# Patient Record
Sex: FEMALE | Race: WHITE | NOT HISPANIC OR LATINO | Employment: PART TIME | URBAN - NONMETROPOLITAN AREA
[De-identification: names, ages, dates, MRNs, and addresses within clinical notes are randomized per-mention and may not be internally consistent; named-entity substitution may affect disease eponyms.]

---

## 2023-01-19 ENCOUNTER — HOSPITAL ENCOUNTER (OUTPATIENT)
Dept: RADIOLOGY | Facility: CLINIC | Age: 58
Discharge: HOME | End: 2023-01-19

## 2023-01-19 DIAGNOSIS — R05.3 PERSISTENT COUGH: ICD-10-CM

## 2023-12-04 ENCOUNTER — EVALUATION (OUTPATIENT)
Dept: PHYSICAL THERAPY | Facility: CLINIC | Age: 58
End: 2023-12-04
Payer: COMMERCIAL

## 2023-12-04 DIAGNOSIS — R42 VERTIGO: Primary | ICD-10-CM

## 2023-12-04 PROCEDURE — 97110 THERAPEUTIC EXERCISES: CPT | Performed by: PHYSICAL THERAPIST

## 2023-12-04 PROCEDURE — 97162 PT EVAL MOD COMPLEX 30 MIN: CPT | Performed by: PHYSICAL THERAPIST

## 2023-12-04 NOTE — PROGRESS NOTES
PT Evaluation     Today's date: 2023  Patient name: Jennifer Edgar  : 1965  MRN: [id number]  Referring provider: Jamison Walker MD  Dx:   Encounter Diagnosis     ICD-10-CM    1. Vertigo  R42                      Assessment  Assessment details: Jennifer Edgar is a pleasant 63 y.o. female presenting to PT with cc of vertiginous dysequilibrium for past 2-3 months. Pt. States issues began insidiously but have been progressively worsening. Upon examination, patient was found to have objective deficits as listed below and is displaying ss consistent with vestibular hypofunction without any bppv or oculomotor deficits. 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 Pt. Was educated on role of PT to address issues. Pt. Would benefit from skilled physical therapy to promote improved function and maximize activity tolerance. Goals  ST weeks  -Pt. Will demonstate narrow dheeraj c EC for 30"  -Pt. Will demonstrate FGA score reduction 25%    Lt weeks  -Pt. Will demonstrate FGA WNL  -pt.  Will demonstrate HARPREET WNL      Plan  Patient would benefit from: skilled physical therapy  Planned therapy interventions: abdominal trunk stabilization, balance, balance/weight bearing training, body mechanics training, functional ROM exercises, gait training, graded motor, transfer training, therapeutic training, therapeutic exercise, therapeutic activities, stretching, strengthening, patient education, neuromuscular re-education, motor coordination training, manual therapy, kinesiology taping, joint mobilization and canalith repositioning  Frequency: 2x week  Duration in weeks: 6  Plan of Care beginning date: 2023  Plan of Care expiration date: 1/15/2024  Treatment plan discussed with: patient        Subjective Evaluation    History of Present Illness  Mechanism of injury: Jennifer Edgar presents to PT with cc of insidious onset dysequilibrium symptoms for past 2-3 months. She denies any PITER that she can recall. She denies any dysphagia, dysarthria, drop attacks. She denies any medication changes. She feels as if the world is moving around her when she is standing still, particularly in busy or stressful environments. She has symptoms of room spinning but this is present when she is standing still. Denies any major symptoms when rolling over in bed. She denies any tinnitus or aural fullness. No pain or paresthesias. Has received blood work. Is taking meclizine from PCP. Patient Goals  Patient goals for therapy: improved balance    Social Support  Steps to enter house: yes  Stairs in house: yes     Employment status: working  Treatments  Previous treatment: medication  Current treatment: medication        Objective     Concurrent Complaints  Positive for memory loss and poor concentration. Negative for nausea/motion sickness, tinnitus, visual change, hearing loss, aural fullness and peripheral neuropathy  Neuro Exam:     Dizziness  Positive for disequilibrium, vertigo, oscillopsia, rocking or swaying and floating or swimming. Negative for motion sickness, light-headedness and diplopia. Exacerbating factors  Positive for bending over, looking up, walking, turning head, supine to/from sitting, optokinetic movement and walking in busy environment.      Oculomotor exam   Oculomotor ROM: WNL  Resting nystagmus: not present   Gaze holding nystagmus: not present left  and not present right  Smooth pursuits: within normal limits  Vertical saccades: normal  Horizontal saccades: normal  Convergence: normal  Cover test: normal  Crossover test: normal  Head thrust: left abnormal and right abnormal  Dynamic visual acuity: normal    Positional testing   [place]-Hallpike   Left posterior canal: WNL  Right posterior canal: WNL  Roll test   Left horizontal canal: WNL  Right horizontal canal: WNL  Positional testing comment: AUGUST MULLINS     See Media      Flowsheet Rows      Flowsheet Row Most Recent Value   PT/OT G-Codes    Current Score 46   Projected Score 61   FOTO information reviewed Yes               Precautions: None     Daily Treatment Diary:      Initial Evaluation Date: 12/04/23  Compliance 12/4                     Visit Number 1                    Re-Eval  IE                 [address] [facility]   Foto Captured Y                           12/4                     Manual                                                                                        Ther-Ex                      Narrow Sixto EC -                     Tandem Stance -                     Tandem Stance EC -                     SLS c task -                     Narrow sixto c head turn toss -                     Cone taps -                                                                                                             Neuro Re-Ed                                                                                                Ther-Act                                                               Modalities

## 2023-12-04 NOTE — LETTER
2023    Tamar Silverio MD  [facility]  [address] [facility].    Patient: Oliver Valdez   YOB: 1965   Date of Visit: 2023     Encounter Diagnosis     ICD-10-CM    1. Vertigo  R42           Dear Dr. Yasir Guerrero: Thank you for your recent referral of Oliver Valdez. Please review the attached evaluation summary from Vicki's recent visit. Please verify that you agree with the plan of care by signing the attached order. If you have any questions or concerns, please do not hesitate to call. I sincerely appreciate the opportunity to share in the care of one of your patients and hope to have another opportunity to work with you in the near future. Sincerely,    Constance Mendez, PT      Referring Provider:      I certify that I have read the below Plan of Care and certify the need for these services furnished under this plan of treatment while under my care. Tamar Silverio MD  [address] [facility] [address]  Via Fax: [phone]          PT Evaluation     Today's date: 2023  Patient name: Oliver Valdez  : 1965  MRN: [id number]  Referring provider: Tamar Silverio MD  Dx:   Encounter Diagnosis     ICD-10-CM    1. Vertigo  R42                      Assessment  Assessment details: Oliver Valdez is a pleasant 63 y.o. female presenting to PT with cc of vertiginous dysequilibrium for past 2-3 months. Pt. States issues began insidiously but have been progressively worsening. Upon examination, patient was found to have objective deficits as listed below and is displaying ss consistent with vestibular hypofunction without any bppv or oculomotor deficits. 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 Pt. Was educated on role of PT to address issues.  Pt. Would benefit from skilled physical therapy to promote improved function and maximize activity tolerance. Goals  ST weeks  -Pt. Will demonstate narrow dheeraj c EC for 30"  -Pt. Will demonstrate FGA score reduction 25%    Lt weeks  -Pt. Will demonstrate FGA WNL  -pt. Will demonstrate HARPREET WNL      Plan  Patient would benefit from: skilled physical therapy  Planned therapy interventions: abdominal trunk stabilization, balance, balance/weight bearing training, body mechanics training, functional ROM exercises, gait training, graded motor, transfer training, therapeutic training, therapeutic exercise, therapeutic activities, stretching, strengthening, patient education, neuromuscular re-education, motor coordination training, manual therapy, kinesiology taping, joint mobilization and canalith repositioning  Frequency: 2x week  Duration in weeks: 6  Plan of Care beginning date: 2023  Plan of Care expiration date: 1/15/2024  Treatment plan discussed with: patient        Subjective Evaluation    History of Present Illness  Mechanism of injury: Don Thibodeaux presents to PT with cc of insidious onset dysequilibrium symptoms for past 2-3 months. She denies any PITER that she can recall. She denies any dysphagia, dysarthria, drop attacks. She denies any medication changes. She feels as if the world is moving around her when she is standing still, particularly in busy or stressful environments. She has symptoms of room spinning but this is present when she is standing still. Denies any major symptoms when rolling over in bed. She denies any tinnitus or aural fullness. No pain or paresthesias. Has received blood work. Is taking meclizine from PCP. Patient Goals  Patient goals for therapy: improved balance    Social Support  Steps to enter house: yes  Stairs in house: yes     Employment status: working  Treatments  Previous treatment: medication  Current treatment: medication        Objective     Concurrent Complaints  Positive for memory loss and poor concentration.  Negative for nausea/motion sickness, tinnitus, visual change, hearing loss, aural fullness and peripheral neuropathy  Neuro Exam:     Dizziness  Positive for disequilibrium, vertigo, oscillopsia, rocking or swaying and floating or swimming. Negative for motion sickness, light-headedness and diplopia. Exacerbating factors  Positive for bending over, looking up, walking, turning head, supine to/from sitting, optokinetic movement and walking in busy environment.      Oculomotor exam   Oculomotor ROM: WNL  Resting nystagmus: not present   Gaze holding nystagmus: not present left  and not present right  Smooth pursuits: within normal limits  Vertical saccades: normal  Horizontal saccades: normal  Convergence: normal  Cover test: normal  Crossover test: normal  Head thrust: left abnormal and right abnormal  Dynamic visual acuity: normal    Positional testing   [place]-Hallpike   Left posterior canal: WNL  Right posterior canal: WNL  Roll test   Left horizontal canal: WNL  Right horizontal canal: WNL  Positional testing comment: AUGUST MULLINS     See Media      Flowsheet Rows      Flowsheet Row Most Recent Value   PT/OT G-Codes    Current Score 46   Projected Score 61   FOTO information reviewed Yes               Precautions: None     Daily Treatment Diary:      Initial Evaluation Date: 12/04/23  Compliance 12/4                     Visit Number 1                    Re-Eval  [facility]   Foto Captured Y                           12/4                     Manual                                                                                        Ther-Ex                      Narrow Dheeraj EC -                     Tandem Stance -                     Tandem Stance EC -                     SLS c task -                     Narrow dheeraj c head turn toss -                     Cone taps -                                                                                                             Neuro Re-Ed Ther-Act                                                               Modalities

## 2023-12-07 ENCOUNTER — OFFICE VISIT (OUTPATIENT)
Dept: PHYSICAL THERAPY | Facility: CLINIC | Age: 58
End: 2023-12-07
Payer: COMMERCIAL

## 2023-12-07 DIAGNOSIS — R42 VERTIGO: Primary | ICD-10-CM

## 2023-12-07 PROCEDURE — 97112 NEUROMUSCULAR REEDUCATION: CPT | Performed by: PHYSICAL THERAPIST

## 2023-12-07 NOTE — PROGRESS NOTES
Daily Note     Today's date: 2023  Patient name: Marcelo Epperson  : 1965  MRN: [id number]  Referring provider: Hetal Parra MD  Dx:   Encounter Diagnosis     ICD-10-CM    1. Vertigo  R42                      Subjective: bryan notes feeling dizzy after eval for 5 minutes. She is movitated to begin therapy to address severe dizziness. Objective: See treatment diary below      Assessment: Pt. Did well with NMR today to address vestibular impairments. Required mod A for all exercises to maintain safety. Pt. Did well with habituation exercises. Pt. Cranial nerve screen wnl. Plan: Continue per plan of care. Progress treatment as tolerated.        Precautions: None     Daily Treatment Diary:      Initial Evaluation Date: 23  Compliance                    Visit Number 1 2                   Re-Eval  IE                 [facility]   Foto Captured Y                                              Manual                                                                                        Ther-Ex                      Narrow Dheeraj EC -  on AE 3x45"                   Tandem Stance EC -  3x45" ea                   AE leans -  15x 3 c EC                   SLS c task -  3x30" ea                   Narrow dheeraj c head turn toss -  3x1'                   Cone taps -  20x                   Wobble board A/P   2x45"                   Wobble Board Lateral -  2x45"                   Jean-Baptiste daroff -  30x                                         Neuro Re-Ed                                                                                                Ther-Act                                                               Modalities

## 2023-12-11 ENCOUNTER — APPOINTMENT (OUTPATIENT)
Dept: PHYSICAL THERAPY | Facility: CLINIC | Age: 58
End: 2023-12-11
Payer: COMMERCIAL

## 2023-12-12 ENCOUNTER — APPOINTMENT (OUTPATIENT)
Dept: PHYSICAL THERAPY | Facility: CLINIC | Age: 58
End: 2023-12-12
Payer: COMMERCIAL

## 2023-12-13 ENCOUNTER — OFFICE VISIT (OUTPATIENT)
Dept: PHYSICAL THERAPY | Facility: CLINIC | Age: 58
End: 2023-12-13
Payer: COMMERCIAL

## 2023-12-13 DIAGNOSIS — R42 VERTIGO: Primary | ICD-10-CM

## 2023-12-13 PROCEDURE — 97112 NEUROMUSCULAR REEDUCATION: CPT | Performed by: PHYSICAL THERAPIST

## 2023-12-13 NOTE — PROGRESS NOTES
Daily Note     Today's date: 2023  Patient name: Amy Alexandre  : 1965  MRN: [id number]  Referring provider: Luisana Arenas MD  Dx:   Encounter Diagnosis     ICD-10-CM    1. Vertigo  R42                      Subjective: Liza Velazquez notes a bit of improvement in baseline symptoms. 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 Objective: See treatment diary below      Assessment: Tolerated treatment well. Patient demonstrated fatigue post treatment, exhibited good technique with therapeutic exercises, and would benefit from continued PT      Plan: Continue per plan of care. Progress treatment as tolerated.        Precautions: None     Daily Treatment Diary:      Initial Evaluation Date: 23  Compliance                  Visit Number 1 2  3                 -al  [address] Capt[address] Y   -                                         Manual                                                                                        Ther-Ex                      Narrow Dheeraj EC -  on AE 3x45"  on AE 3x45"                 Tandem Stance EC -  3x45" ea  3x45" ea                 AE leans -  15x 3 c EC  15x min A EC                 SLS c task -  3x30" ea  3x45"                 Narrow dheeraj c head turn toss -  3x1' 2x1'                 Cone taps -  20x  20x                 Wobble board A/P   2x45"  2x45"                 Wobble Board Lateral -  2x45"  2x45"                 Jean-Baptiste daroff -  30x  1x 30" holds                                       Neuro Re-Ed                                                                                                Ther-Act                                                               Modalities

## 2023-12-18 ENCOUNTER — OFFICE VISIT (OUTPATIENT)
Dept: PHYSICAL THERAPY | Facility: CLINIC | Age: 58
End: 2023-12-18
Payer: COMMERCIAL

## 2023-12-18 DIAGNOSIS — R42 VERTIGO: Primary | ICD-10-CM

## 2023-12-18 PROCEDURE — 97112 NEUROMUSCULAR REEDUCATION: CPT | Performed by: PHYSICAL THERAPIST

## 2023-12-18 NOTE — PROGRESS NOTES
"Daily Note     Today's date: 2023  Patient name: Vicki Smith  : 1965  MRN: [id number]  Referring provider: Sunita Mitchell MD  Dx:   Encounter Diagnosis     ICD-10-CM    1. 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 She felt that flower arrangements caused her to have dizziness.       Objective: See treatment diary below      Assessment: Tolerated treatment well. Patient demonstrated fatigue post treatment, exhibited good technique with therapeutic exercises, and would benefit from continued PT      Plan: Continue per plan of care.  Progress treatment as tolerated.       Precautions: None     Daily Treatment Diary:      Initial Evaluation Date: 23  Compliance                Visit Number 1 2  3  4               Ivone-John  IE   -  -            MC   Foto Captured Y   -  -                                     Manual                                                                                        Ther-Ex                      Narrow Dheeraj EC -  on AE 3x45\"  on AE 3x45\"  on AE 3x45\"               Tandem Stance EC -  3x45\" ea  3x45\" ea  3x45\"               AE leans -  15x 3 c EC  15x min A EC  15x min A               SLS c task -  3x30\" ea  3x45\"                 Narrow dheeraj c head turn toss -  3x1' 2x1'  2x1'               Cone taps -  20x  20x  ball taps c spatial line for visual cue               Wobble board A/P   2x45\"  2x45\"  2x45\"               Wobble Board Lateral -  2x45\"  2x45\"  2x45\"               Jean-Baptiste daroff -  30x  1x 30\" holds  2x hep                                     Neuro Re-Ed                      Wide dheeraj c turn and tposs - - - 3x60\"               Gait c tasks - - - 10' head turns, ec, backwards, lateral, fast turns                                                Ther-Act                                                            "    Modalities

## 2023-12-27 ENCOUNTER — OFFICE VISIT (OUTPATIENT)
Dept: PHYSICAL THERAPY | Facility: CLINIC | Age: 58
End: 2023-12-27
Payer: COMMERCIAL

## 2023-12-27 DIAGNOSIS — R42 VERTIGO: Primary | ICD-10-CM

## 2023-12-27 PROCEDURE — 97112 NEUROMUSCULAR REEDUCATION: CPT | Performed by: PHYSICAL THERAPIST

## 2023-12-28 NOTE — PROGRESS NOTES
"Daily Note     Today's date: 2023  Patient name: Vicki Smith  : 1965  MRN: [id number]  Referring provider: Sunita Mitchell MD  Dx:   Encounter Diagnosis     ICD-10-CM    1. Vertigo  R42                      Subjective: Vicki notes a consistent trend of slow steady improvement. She also notes consistent symptom waxing/waning associated with emotional stresses. She is planning to address this medically through pcp and with mindful meditation/prayer.       Objective: See treatment diary below  Biodex mctsib shows improved in all categories. (Moderate impairment)    Assessment: Tolerated treatment well. Patient demonstrated fatigue post treatment, exhibited good technique with therapeutic exercises, and would benefit from continued PT.   Noted isolation of symptoms with horizontal acceleration versus rotary. Updated hep to address. Recommend continued PT.       Plan: Continue per plan of care.  Progress treatment as tolerated.       Precautions: None     Daily Treatment Diary:      Initial Evaluation Date: 23  Compliance              Visit Number 1 2  3  4  5             Re-Eval  IE   -  -  -          MC   Foto Captured Y   -  -  -                                 Manual                                                                                        Ther-Ex                      Narrow Dheeraj EC -  on AE 3x45\"  on AE 3x45\"  on AE 3x45\"  on AE 3x45'             Tandem Stance EC -  3x45\" ea  3x45\" ea  3x45\"  3x45'             AE leans -  15x 3 c EC  15x min A EC  15x min A  15x min A             SLS c task -  3x30\" ea  3x45\"    test             Narrow dheeraj c head turn toss -  3x1' 2x1'  2x1'               Cone taps -  20x  20x  ball taps c spatial line for visual cue               Wobble board A/P   2x45\"  2x45\"  2x45\"  2x45\"             Wobble Board Lateral -  2x45\"  2x45\"  2x45\"  2x45\"             Estiven parraoff -  30x  1x 30\" holds  " "2x hep                                     Neuro Re-Ed                      Wide dheeraj c turn and tposs - - - 3x60\"               Gait c tasks - - - 10' head turns, ec, backwards, lateral, fast turns 15' 4 tasks, up down, l/r, tandem, fast/slow                                               Ther-Act                                                               Modalities                                                                                          "

## 2024-01-03 ENCOUNTER — OFFICE VISIT (OUTPATIENT)
Dept: PHYSICAL THERAPY | Facility: CLINIC | Age: 59
End: 2024-01-03
Payer: COMMERCIAL

## 2024-01-03 DIAGNOSIS — R42 VERTIGO: Primary | ICD-10-CM

## 2024-01-03 PROCEDURE — 97112 NEUROMUSCULAR REEDUCATION: CPT | Performed by: PHYSICAL THERAPIST

## 2024-01-03 NOTE — PROGRESS NOTES
"Daily Note     Today's date: 1/3/2024  Patient name: Vicki Smith  : 1965  MRN: [id number]  Referring provider: Sunita Mitchell MD  Dx:   Encounter Diagnosis     ICD-10-CM    1. Vertigo  R42                      Subjective: Vicki is feeling an improvement in balance. She was diligent with HEP with help from .       Objective: See treatment diary below      Assessment: Tolerated treatment well. Patient demonstrated fatigue post treatment, exhibited good technique with therapeutic exercises, and would benefit from continued PT      Plan: Continue per plan of care.  Progress treatment as tolerated.       Precautions: None     Daily Treatment Diary:      Initial Evaluation Date: 23  Compliance 12/4  12/7  12/13  12/18  12/27  1/3           Visit Number 1 2  3  4  5  6           Re-Eval  IE   -  -  -  -        MC   Foto Captured Y   -  -  -  NV                  12/4  12/7  12/13  12/18  12/27  1/3           Manual                                                                                        Ther-Ex                      Narrow Sixto EC -  on AE 3x45\"  on AE 3x45\"  on AE 3x45\"  on AE 3x45'  on AE 3x45\"           Tandem Stance EC -  3x45\" ea  3x45\" ea  3x45\"  3x45'  3x45'           AE leans -  15x 3 c EC  15x min A EC  15x min A  15x min A  20x eyes closed mod a           SLS c task -  3x30\" ea  3x45\"    test  ball movement 3'           Narrow sixto c head turn toss -  3x1' 2x1'  2x1'    2x1' in bars           Cone taps -  20x  20x  ball taps c spatial line for visual cue               Wobble board A/P   2x45\"  2x45\"  2x45\"  2x45\"  3'            Wobble Board Lateral -  2x45\"  2x45\"  2x45\"  2x45\"  3'           Jean-Baptiste daroff -  30x  1x 30\" holds  2x hep                                     Neuro Re-Ed                      Wide sixto c turn and tposs - - - 3x60\"               Gait c tasks - - - 10' head turns, ec, backwards, lateral, fast turns 15' 4 tasks, up down, l/r, tandem, fast/slow 5'          "                                     Ther-Act                                                               Modalities

## 2024-01-10 ENCOUNTER — OFFICE VISIT (OUTPATIENT)
Dept: PHYSICAL THERAPY | Facility: CLINIC | Age: 59
End: 2024-01-10
Payer: COMMERCIAL

## 2024-01-10 DIAGNOSIS — R42 VERTIGO: Primary | ICD-10-CM

## 2024-01-10 PROCEDURE — 97112 NEUROMUSCULAR REEDUCATION: CPT | Performed by: PHYSICAL THERAPIST

## 2024-01-10 NOTE — PROGRESS NOTES
"Daily Note     Today's date: 1/10/2024  Patient name: Vicki Smith  : 1965  MRN: [id number]  Referring provider: Sunita Mitchell MD  Dx:   Encounter Diagnosis     ICD-10-CM    1. Vertigo  R42                      Subjective: Vicki notes vestibular disorders remain improved, but has intermittent \"bad days.\" She continued trend of stress associated with disequilibrium remains. She feels Sundays are the worst for her, even when not working as .       Objective: See treatment diary below      Assessment: Tolerated treatment well. Patient demonstrated fatigue post treatment, exhibited good technique with therapeutic exercises, and would benefit from continued PT      Plan: Continue per plan of care.  Progress treatment as tolerated.  MD write up next visit.      Precautions: None     Daily Treatment Diary:      Initial Evaluation Date: 23  Compliance 12/4  12/7  12/13  12/18  12/27  1/3  1/10         Visit Number 1 2  3  4  5  6  7         Re-Eval  IE   -  -  -  -  NV      MC   Foto Captured Y   -  -  -  NV  NV                12/4  12/7  12/13  12/18  12/27  1/3  1/10         Manual                                                                                        Ther-Ex                      Narrow Sixto EC -  on AE 3x45\"  on AE 3x45\"  on AE 3x45\"  on AE 3x45'  on AE 3x45\"  on AE 3x45'         Tandem Stance EC -  3x45\" ea  3x45\" ea  3x45\"  3x45'  3x45'  3x45' in // bars         AE leans -  15x 3 c EC  15x min A EC  15x min A  15x min A  20x eyes closed mod a  20x ec         SLS c task -  3x30\" ea  3x45\"    test  ball movement 3'  nv         Narrow sixto c head turn toss -  3x1' 2x1'  2x1'    2x1' in bars  dc         Cone taps -  20x  20x  ball taps c spatial line for visual cue      cone walks 5L         Wobble board A/P   2x45\"  2x45\"  2x45\"  2x45\"  3'   3'         Wobble Board Lateral -  2x45\"  2x45\"  2x45\"  2x45\"  3'  3'         Jean-Baptiste daroff -  30x  1x 30\" holds  2x hep                        " "             Neuro Re-Ed                      Wide dheeraj c turn and tposs - - - 3x60\"      nv         Gait c tasks - - - 10' head turns, ec, backwards, lateral, fast turns 15' 4 tasks, up down, l/r, tandem, fast/slow 5' 5' floor, 5' steps                                             Ther-Act                                                               Modalities                                                                                              "

## 2024-01-17 ENCOUNTER — APPOINTMENT (OUTPATIENT)
Dept: PHYSICAL THERAPY | Facility: CLINIC | Age: 59
End: 2024-01-17
Payer: COMMERCIAL

## 2024-01-24 ENCOUNTER — OFFICE VISIT (OUTPATIENT)
Dept: PHYSICAL THERAPY | Facility: CLINIC | Age: 59
End: 2024-01-24
Payer: COMMERCIAL

## 2024-01-24 DIAGNOSIS — R42 VERTIGO: Primary | ICD-10-CM

## 2024-01-24 PROCEDURE — 97112 NEUROMUSCULAR REEDUCATION: CPT | Performed by: PHYSICAL THERAPIST

## 2024-01-24 NOTE — PROGRESS NOTES
"Daily Note     Today's date: 2024  Patient name: Vicki Smith  : 1965  MRN: [id number]  Referring provider: Sunita Mitchell MD  Dx:   Encounter Diagnosis     ICD-10-CM    1. Vertigo  R42                      Subjective: Vicki states her balance seems steadily improved. Feels Adventist is also going better.       Objective: See treatment diary below      Assessment: Tolerated treatment well. Patient demonstrated fatigue post treatment, exhibited good technique with therapeutic exercises, and would benefit from continued PT      Plan: Continue per plan of care.  Progress treatment as tolerated.       Precautions: None     Daily Treatment Diary:      Initial Evaluation Date: 23  Compliance 12/4  12/7  12/13  12/18  12/27  1/3  1/10  1/24       Visit Number 1 2  3  4  5  6  7  8       Re-Eval  IE   -  -  -  -  NV  NV    MC   Foto Captured Y   -  -  -  NV  NV  NV              12/4  12/7  12/13  12/18  12/27  1/3  1/10  1/24       Manual                                                                                        Ther-Ex                      Narrow Sixto EC -  on AE 3x45\"  on AE 3x45\"  on AE 3x45\"  on AE 3x45'  on AE 3x45\"  on AE 3x45'  on AE 3x45'       Tandem Stance EC -  3x45\" ea  3x45\" ea  3x45\"  3x45'  3x45'  3x45' in // bars  3x45'       AE leans -  15x 3 c EC  15x min A EC  15x min A  15x min A  20x eyes closed mod a  20x ec  20x ea       SLS c task -  3x30\" ea  3x45\"    test  ball movement 3'  nv  2x1'       Narrow sixto c head turn toss -  3x1' 2x1'  2x1'    2x1' in bars  dc         Cone taps -  20x  20x  ball taps c spatial line for visual cue      cone walks 5L  cone walks 5L c steady tap       Wobble board A/P   2x45\"  2x45\"  2x45\"  2x45\"  3'   3'  3'       Wobble Board Lateral -  2x45\"  2x45\"  2x45\"  2x45\"  3'  3'  3'       Jean-Baptiste daroff -  30x  1x 30\" holds  2x hep                                     Neuro Re-Ed                      Wide isxto c turn and tposs - - - 3x60\"      nv      "    Gait c tasks - - - 10' head turns, ec, backwards, lateral, fast turns 15' 4 tasks, up down, l/r, tandem, fast/slow 5' 5' floor, 5' steps 5' floor, 5' steps                                            Ther-Act                                                               Modalities

## 2024-01-31 ENCOUNTER — APPOINTMENT (OUTPATIENT)
Dept: PHYSICAL THERAPY | Facility: CLINIC | Age: 59
End: 2024-01-31
Payer: COMMERCIAL

## 2024-02-05 ENCOUNTER — EVALUATION (OUTPATIENT)
Dept: PHYSICAL THERAPY | Facility: CLINIC | Age: 59
End: 2024-02-05
Payer: COMMERCIAL

## 2024-02-05 DIAGNOSIS — R42 VERTIGO: Primary | ICD-10-CM

## 2024-02-05 PROCEDURE — 97112 NEUROMUSCULAR REEDUCATION: CPT | Performed by: PHYSICAL THERAPIST

## 2024-02-05 PROCEDURE — 97164 PT RE-EVAL EST PLAN CARE: CPT | Performed by: PHYSICAL THERAPIST

## 2024-02-05 NOTE — PROGRESS NOTES
"PT Re-Evaluation     Today's date: 2024  Patient name: Vicki Smith  : 1965  MRN: [id number]  Referring provider: Sunita Mitchell MD  Dx:   Encounter Diagnosis     ICD-10-CM    1. Vertigo  R42                      Assessment  Assessment details: Vicki Smith has continued with PT 1-2x/week for treatment of vertiginous symptoms.   To this point, vicki has completed 7 treatment sessions 2* busy home life.  She is showing improved vestibular function as evidenced with functional gait assessment and biodex balance testing. Symptoms have become more isolated to \"stressful\" or \"overwhelming\" environments per patient.   In therapy, she does show a fear avoidance with transitions that worsens her performance as compared to when she is not fixated on task.   She plans to discuss stress of brain fog and family's awareness of her working memory deficits with her PCP, Sunita Mitchell.   As she is showing improved vestibular performance with subsequent improvement in subjective function (60% per patient), recommend continued PT.             Goals  ST weeks  -Pt. Will demonstate narrow dheeraj c EC for 30\" - met  -Pt. Will demonstrate FGA score reduction 25% - met    Lt weeks  -Pt. Will demonstrate FGA WNL - not met  -pt. Will demonstrate HARPREET WNL - not met      Plan  Patient would benefit from: skilled physical therapy  Planned therapy interventions: abdominal trunk stabilization, balance, balance/weight bearing training, body mechanics training, functional ROM exercises, gait training, graded motor, transfer training, therapeutic training, therapeutic exercise, therapeutic activities, stretching, strengthening, patient education, neuromuscular re-education, motor coordination training, manual therapy, kinesiology taping, joint mobilization and canalith repositioning  Frequency: 2x week  Duration in weeks: 6  Plan of Care beginning date: 2024  Plan of Care expiration date: 3/18/2024  Treatment plan " discussed with: patient        Subjective Evaluation    History of Present Illness  Mechanism of injury: Vicki Smith is pleased with ongoing progress in PT. Admittedly, she feels progress is slow.   She rates her improvement at 60%.   She feels stress will exacerbate her symptoms.   She is doing better with navigating stairs and uneven surfaces while working as [employer] , but also notes that her anxiety over it causes reduced function with task.   She is motivated to continue with PT.   Patient Goals  Patient goals for therapy: improved balance    Social Support  Steps to enter house: yes  Stairs in house: yes     Employment status: working  Treatments  Previous treatment: medication  Current treatment: medication        Objective     Concurrent Complaints  Positive for memory loss and poor concentration. Negative for nausea/motion sickness, tinnitus, visual change, hearing loss, aural fullness and peripheral neuropathy  Neuro Exam:     Dizziness  Positive for disequilibrium, vertigo, rocking or swaying and floating or swimming.   Negative for oscillopsia, motion sickness, light-headedness and diplopia.     Exacerbating factors  Positive for walking, turning head, supine to/from sitting, optokinetic movement and walking in busy environment.   Negative for bending over and looking up.     Oculomotor exam   Oculomotor ROM: WNL  Resting nystagmus: not present   Gaze holding nystagmus: not present left  and not present right  Smooth pursuits: within normal limits  Vertical saccades: normal  Horizontal saccades: normal  Convergence: normal  Cover test: normal  Crossover test: normal  Head thrust: left abnormal and right abnormal  Dynamic visual acuity: normal    Positional testing   [place]-Hallpike   Left posterior canal: WNL  Right posterior canal: WNL  Roll test   Left horizontal canal: WNL  Right horizontal canal: WNL  Positional testing comment: FGMARIA EUGENIA MULLINS     See Media      Balance assessments   MCTSIB   Eyes open  "level surface: 0.85  Eyes open foam surface: 1.56  Eyes closed level surface: 1.47  Eyes closed foam surface: 5.90               Precautions: None     Daily Treatment Diary:      Initial Evaluation Date: 12/04/23  Compliance 12/4  12/7  12/13  12/18  12/27  1/3  1/10  1/24  2/5     Visit Number 1 2  3  4  5  6  7  8  9     Re-Eval  IE   -  -  -  -  NV  NV  Y  MC   Foto Captured Y   -  -  -  NV  NV  NV  Y            12/4  12/7  12/13  12/18  12/27  1/3  1/10  1/24  2/5     Manual                                                                                        Ther-Ex                      Narrow Sixto EC -  on AE 3x45\"  on AE 3x45\"  on AE 3x45\"  on AE 3x45'  on AE 3x45\"  on AE 3x45'  on AE 3x45'  on AE 3x45     Tandem Stance EC -  3x45\" ea  3x45\" ea  3x45\"  3x45'  3x45'  3x45' in // bars  3x45'  3x45\"     AE leans -  15x 3 c EC  15x min A EC  15x min A  15x min A  20x eyes closed mod a  20x ec  20x ea  20x ea     SLS c task -  3x30\" ea  3x45\"    test  ball movement 3'  nv  2x1'  2x1'     Narrow sixto c head turn toss -  3x1' 2x1'  2x1'    2x1' in bars  dc         Cone taps -  20x  20x  ball taps c spatial line for visual cue      cone walks 5L  cone walks 5L c steady tap  cone walks 5L  dual task**   Wobble board A/P   2x45\"  2x45\"  2x45\"  2x45\"  3'   3'  3'  3'     Wobble Board Lateral -  2x45\"  2x45\"  2x45\"  2x45\"  3'  3'  3'  3'     Jean-Baptiste daroff -  30x  1x 30\" holds  2x hep                                     Neuro Re-Ed                      Wide sixto c turn and tposs - - - 3x60\"      nv         Gait c tasks - - - 10' head turns, ec, backwards, lateral, fast turns 15' 4 tasks, up down, l/r, tandem, fast/slow 5' 5' floor, 5' steps 5' floor, 5' steps 10' steps fear avoidance traiing                                           Ther-Act                                                               Modalities                                                                                 "

## 2024-02-05 NOTE — LETTER
"2024    Sunita Mitchell MD  [address] [facility] [address]    Patient: Vicki Smith   YOB: 1965   Date of Visit: 2024     Encounter Diagnosis     ICD-10-CM    1. Vertigo  R42           Dear Dr. Mitchell:    Thank you for your recent referral of Vicki Smith. Please review the attached evaluation summary from Vicki's recent visit.     Please verify that you agree with the plan of care by signing the attached order.     If you have any questions or concerns, please do not hesitate to call.     I sincerely appreciate the opportunity to share in the care of one of your patients and hope to have another opportunity to work with you in the near future.       Sincerely,    Aba Perez, PT      Referring Provider:      I certify that I have read the below Plan of Care and certify the need for these services furnished under this plan of treatment while under my care.                    Sunita Mitchell MD  [address] [facility] [address]  Via Fax: [phone]          PT Re-Evaluation     Today's date: 2024  Patient name: Vicki Smith  : 1965  MRN: [id number]  Referring provider: Sunita Mitchell MD  Dx:   Encounter Diagnosis     ICD-10-CM    1. Vertigo  R42                      Assessment  Assessment details: Vicki Smith has continued with PT 1-2x/week for treatment of vertiginous symptoms.   To this point, vicki has completed 7 treatment sessions 2* busy home life.  She is showing improved vestibular function as evidenced with functional gait assessment and biodex balance testing. Symptoms have become more isolated to \"stressful\" or \"overwhelming\" environments per patient.   In therapy, she does show a fear avoidance with transitions that worsens her performance as compared to when she is not fixated on task.   She plans to discuss stress of brain fog and family's awareness of her working memory deficits with her PCP, Sunita Mitchell.   As she " "is showing improved vestibular performance with subsequent improvement in subjective function (60% per patient), recommend continued PT.             Goals  ST weeks  -Pt. Will demonstate narrow dheeraj c EC for 30\" - met  -Pt. Will demonstrate FGA score reduction 25% - met    Lt weeks  -Pt. Will demonstrate FGA WNL - not met  -pt. Will demonstrate HARPREET WNL - not met      Plan  Patient would benefit from: skilled physical therapy  Planned therapy interventions: abdominal trunk stabilization, balance, balance/weight bearing training, body mechanics training, functional ROM exercises, gait training, graded motor, transfer training, therapeutic training, therapeutic exercise, therapeutic activities, stretching, strengthening, patient education, neuromuscular re-education, motor coordination training, manual therapy, kinesiology taping, joint mobilization and canalith repositioning  Frequency: 2x week  Duration in weeks: 6  Plan of Care beginning date: 2024  Plan of Care expiration date: 3/18/2024  Treatment plan discussed with: patient        Subjective Evaluation    History of Present Illness  Mechanism of injury: Vicki Smith is pleased with ongoing progress in PT. Admittedly, she feels progress is slow.   She rates her improvement at 60%.   She feels stress will exacerbate her symptoms.   She is doing better with navigating stairs and uneven surfaces while working as [employer] , but also notes that her anxiety over it causes reduced function with task.   She is motivated to continue with PT.   Patient Goals  Patient goals for therapy: improved balance    Social Support  Steps to enter house: yes  Stairs in house: yes     Employment status: working  Treatments  Previous treatment: medication  Current treatment: medication        Objective     Concurrent Complaints  Positive for memory loss and poor concentration. Negative for nausea/motion sickness, tinnitus, visual change, hearing loss, aural fullness and " "peripheral neuropathy  Neuro Exam:     Dizziness  Positive for disequilibrium, vertigo, rocking or swaying and floating or swimming.   Negative for oscillopsia, motion sickness, light-headedness and diplopia.     Exacerbating factors  Positive for walking, turning head, supine to/from sitting, optokinetic movement and walking in busy environment.   Negative for bending over and looking up.     Oculomotor exam   Oculomotor ROM: WNL  Resting nystagmus: not present   Gaze holding nystagmus: not present left  and not present right  Smooth pursuits: within normal limits  Vertical saccades: normal  Horizontal saccades: normal  Convergence: normal  Cover test: normal  Crossover test: normal  Head thrust: left abnormal and right abnormal  Dynamic visual acuity: normal    Positional testing   [place]-Hallpike   Left posterior canal: WNL  Right posterior canal: WNL  Roll test   Left horizontal canal: WNL  Right horizontal canal: WNL  Positional testing comment: AUGUST MULLINS     See Media      Balance assessments   MCTSIB   Eyes open level surface: 0.85  Eyes open foam surface: 1.56  Eyes closed level surface: 1.47  Eyes closed foam surface: 5.90               Precautions: None     Daily Treatment Diary:      Initial Evaluation Date: 12/04/23  Compliance 12/4  12/7  12/13  12/18  12/27  1/3  1/10  1/24  2/5     Visit Number 1 2  3  4  5  6  7  8  9     Re-Eval  IE   -  -  -  -  NV  NV  Y     Foto Captured Y   -  -  -  NV  NV  NV  Y            12/4  12/7  12/13  12/18  12/27  1/3  1/10  1/24  2/5     Manual                                                                                        Ther-Ex                      Narrow Sixto EC -  on AE 3x45\"  on AE 3x45\"  on AE 3x45\"  on AE 3x45'  on AE 3x45\"  on AE 3x45'  on AE 3x45'  on AE 3x45     Tandem Stance EC -  3x45\" ea  3x45\" ea  3x45\"  3x45'  3x45'  3x45' in // bars  3x45'  3x45\"     AE leans -  15x 3 c EC  15x min A EC  15x min A  15x min A  20x eyes closed mod a  20x ec  20x ea  " "20x ea     SLS c task -  3x30\" ea  3x45\"    test  ball movement 3'  nv  2x1'  2x1'     Narrow dheeraj c head turn toss -  3x1' 2x1'  2x1'    2x1' in bars  dc         Cone taps -  20x  20x  ball taps c spatial line for visual cue      cone walks 5L  cone walks 5L c steady tap  cone walks 5L  dual task**   Wobble board A/P   2x45\"  2x45\"  2x45\"  2x45\"  3'   3'  3'  3'     Wobble Board Lateral -  2x45\"  2x45\"  2x45\"  2x45\"  3'  3'  3'  3'     Jean-Baptiste daroff -  30x  1x 30\" holds  2x hep                                     Neuro Re-Ed                      Wide dheeraj c turn and tposs - - - 3x60\"      nv         Gait c tasks - - - 10' head turns, ec, backwards, lateral, fast turns 15' 4 tasks, up down, l/r, tandem, fast/slow 5' 5' floor, 5' steps 5' floor, 5' steps 10' steps fear avoidance traiing                                           Ther-Act                                                               Modalities                                                                                                 "

## 2024-02-07 ENCOUNTER — OFFICE VISIT (OUTPATIENT)
Dept: PHYSICAL THERAPY | Facility: CLINIC | Age: 59
End: 2024-02-07
Payer: COMMERCIAL

## 2024-02-07 DIAGNOSIS — R42 VERTIGO: Primary | ICD-10-CM

## 2024-02-07 PROCEDURE — 97112 NEUROMUSCULAR REEDUCATION: CPT | Performed by: PHYSICAL THERAPIST

## 2024-02-07 NOTE — PROGRESS NOTES
"Daily Note     Today's date: 2024  Patient name: Vicki Smith  : 1965  MRN: [id number]  Referring provider: Sunita Mitchell MD  Dx:   Encounter Diagnosis     ICD-10-CM    1. Vertigo  R42                      Subjective: Vicki feels \"so much better\" following re eval last session. She did not have f/u with PCP yet.       Objective: See treatment diary below      Assessment: Tolerated treatment well. Patient demonstrated fatigue post treatment, exhibited good technique with therapeutic exercises, and would benefit from continued PT      Plan: Continue per plan of care.  Progress treatment as tolerated.       Precautions: None     Daily Treatment Diary:      Initial Evaluation Date: 23  Compliance 12/4  12/7  12/13  12/18  12/27  1/3  1/10  1/24  2/5  2/7   Visit Number 1 2  3  4  5  6  7  8  9  10   Re-Eval  IE   -  -  -  -  NV  NV  Y  -   Foto Captured Y   -  -  -  NV  NV  NV  Y  -          12/4  12/7  12/13  12/18  12/27  1/3  1/10  1/24  2/5  2/7   Manual                                                                                        Ther-Ex                      Narrow Sixto EC -  on AE 3x45\"  on AE 3x45\"  on AE 3x45\"  on AE 3x45'  on AE 3x45\"  on AE 3x45'  on AE 3x45'  on AE 3x45  on ae c dual task 3x45\"   Tandem Stance EC -  3x45\" ea  3x45\" ea  3x45\"  3x45'  3x45'  3x45' in // bars  3x45'  3x45\"  walking c ts 5L eo and ec   AE leans -  15x 3 c EC  15x min A EC  15x min A  15x min A  20x eyes closed mod a  20x ec  20x ea  20x ea  20x   SLS c task -  3x30\" ea  3x45\"    test  ball movement 3'  nv  2x1'  2x1'     Narrow sixto c head turn toss -  3x1' 2x1'  2x1'    2x1' in bars  dc         Cone taps -  20x  20x  ball taps c spatial line for visual cue      cone walks 5L  cone walks 5L c steady tap  cone walks 5L  dual task**  10x ball balance   Wobble board A/P   2x45\"  2x45\"  2x45\"  2x45\"  3'   3'  3'  3'  nv   Wobble Board Lateral -  2x45\"  2x45\"  2x45\"  2x45\"  3'  3'  3'  3'  nv   Jean-Baptiste " "daroff -  30x  1x 30\" holds  2x hep                                     Neuro Re-Ed                      Wide dheeraj c turn and tposs - - - 3x60\"      nv         Gait c tasks - - - 10' head turns, ec, backwards, lateral, fast turns 15' 4 tasks, up down, l/r, tandem, fast/slow 5' 5' floor, 5' steps 5' floor, 5' steps 10' steps fear avoidance traiing Obstacle course and starirs 15' c dual task                                          Ther-Act                                                               Modalities                                                                                 "

## 2024-02-13 ENCOUNTER — APPOINTMENT (OUTPATIENT)
Dept: PHYSICAL THERAPY | Facility: CLINIC | Age: 59
End: 2024-02-13
Payer: COMMERCIAL

## 2024-02-14 ENCOUNTER — OFFICE VISIT (OUTPATIENT)
Dept: PHYSICAL THERAPY | Facility: CLINIC | Age: 59
End: 2024-02-14
Payer: COMMERCIAL

## 2024-02-14 DIAGNOSIS — R42 VERTIGO: Primary | ICD-10-CM

## 2024-02-14 PROCEDURE — 97112 NEUROMUSCULAR REEDUCATION: CPT | Performed by: PHYSICAL THERAPIST

## 2024-02-14 NOTE — PROGRESS NOTES
"Daily Note     Today's date: 2024  Patient name: Vicki Smith  : 1965  MRN: [id number]  Referring provider: Sunita Mitchell MD  Dx:   Encounter Diagnosis     ICD-10-CM    1. Vertigo  R42                      Subjective: Vicki notes one frustrating episode of unsteaidness while working this weekend.       Objective: See treatment diary below      Assessment: Vicki Smith was progressed with PT interventions, focusing on appropriate technique and intensity. Pt. Required moderate cuing from therapist to complete. Right tendency during gait with EC remains. Continue to work on hypofunctioning and habittauin exercises to train.  Pt. Would benefit from continued physical therapy to promote improved activity tolerance and function.         Plan: Continue per plan of care.  Progress treatment as tolerated.       Precautions: None     Daily Treatment Diary:      Initial Evaluation Date: 23  Compliance 2/14     12/27  1/3  1/10  1/24  2/5  2/7   Visit Number 14     5  6  7  8  9  10   Re-Eval  -     -  -  NV  NV  Y  -   Foto Captured -     -  NV  NV  NV  Y  -          2/14     12/27  1/3  1/10  1/24  2/5  2/7   Manual                   epley 2x ea                                                        Ther-Ex                   Narrow Dheeraj EC On ae c dual task     on AE 3x45'  on AE 3x45\"  on AE 3x45'  on AE 3x45'  on AE 3x45  on ae c dual task 3x45\"   Tandem Stance EC Walking and static 5'     3x45'  3x45'  3x45' in // bars  3x45'  3x45\"  walking c ts 5L eo and ec   AE leans 20x ea c guarding     15x min A  20x eyes closed mod a  20x ec  20x ea  20x ea  20x   SLS c task Cone taps 20x     test  ball movement 3'  nv  2x1'  2x1'     Narrow dheeraj c head turn toss -       2x1' in bars  dc         Cone taps above         cone walks 5L  cone walks 5L c steady tap  cone walks 5L  dual task**  10x ball balance   Wobble board A/P --     2x45\"  3'   3'  3'  3'  nv   Wobble Board Lateral      2x45\"  3'  3'  3'  3'  " nv   Estiven koch Hep added                                     Neuro Re-Ed                   Wide dheeraj c turn and tposs          nv         Gait c tasks 15' steps, pbstacles     15' 4 tasks, up down, l/r, tandem, fast/slow 5' 5' floor, 5' steps 5' floor, 5' steps 10' steps fear avoidance traiing Obstacle course and starirs 15' c dual task                                          Ther-Act                                                            Modalities

## 2024-02-28 ENCOUNTER — OFFICE VISIT (OUTPATIENT)
Dept: PHYSICAL THERAPY | Facility: CLINIC | Age: 59
End: 2024-02-28
Payer: COMMERCIAL

## 2024-02-28 DIAGNOSIS — R42 VERTIGO: Primary | ICD-10-CM

## 2024-02-28 PROCEDURE — 97112 NEUROMUSCULAR REEDUCATION: CPT

## 2024-02-28 NOTE — PROGRESS NOTES
"Daily Note     Today's date: 2024  Patient name: Vicki Smith  : 1965  MRN: [id number]  Referring provider: Sunita Mitchell MD  Dx:   Encounter Diagnosis     ICD-10-CM    1. Vertigo  R42           Start Time: 0750  Stop Time: 0845  Total time in clinic (min): 55 minutes    Subjective: patient reports that she has noticed improvements regarding her symptoms over the past two weeks. She notes that the feeling she had in her legs has mostly subsided and that the intensity of her symptoms is overall decreasing.      Objective: See treatment diary below      Assessment: Tolerated treatment well. Patient demonstrated fatigue post treatment and requires continuous facilitative cueing to ensure appropriate technique is maintained throughout. Continue to demonstrate difficulty maintaining steadiness  in tandem stance/gait. As well as diminished righting reactions exacerbated by dual tasks. Would continue to benefit from skilled therapy services in order to maximize function.      Plan: Continue per plan of care.      Precautions: None     Daily Treatment Diary:      Initial Evaluation Date: 23  Compliance 2/14 2/28    12/27  1/3  1/10  1/24  2/5  2/7   Visit Number 14 15    5  6  7  8  9  10   Re-Eval  -     -  -  NV  NV  Y  -   Foto Captured -     -  NV  NV  NV  Y  -          2/14 2/28    12/27  1/3  1/10  1/24  2/5  2/7   Manual                   epley 2x ea                                                        Ther-Ex                   Narrow Sixto EC On ae c dual task On AE  5'    on AE 3x45'  on AE 3x45\"  on AE 3x45'  on AE 3x45'  on AE 3x45  on ae c dual task 3x45\"   Tandem Stance EC Walking and static 5' Walking and static 5'    3x45'  3x45'  3x45' in // bars  3x45'  3x45\"  walking c ts 5L eo and ec   AE leans 20x ea c guarding 20x ea   20x + head turns    15x min A  20x eyes closed mod a  20x ec  20x ea  20x ea  20x   SLS c task Cone taps 20x Cone taps with crossing midline  20x ea    test  ball " "movement 3'  nv  2x1'  2x1'     Narrow dheeraj c head turn toss -       2x1' in bars  dc         Cone taps above         cone walks 5L  cone walks 5L c steady tap  cone walks 5L  dual task**  10x ball balance   Wobble board A/P --     2x45\"  3'   3'  3'  3'  nv   Wobble Board Lateral      2x45\"  3'  3'  3'  3'  nv   Estiven koch Hep added                                     Neuro Re-Ed                   Wide dheeraj c turn and tposs          nv         Gait c tasks 15' steps, pbstacles  15' obstacles c dual task/  Balance board + ball   15' 4 tasks, up down, l/r, tandem, fast/slow 5' 5' floor, 5' steps 5' floor, 5' steps 10' steps fear avoidance traiing Obstacle course and starirs 15' c dual task                                          Ther-Act                                                            Modalities                                                                               "

## 2024-03-05 ENCOUNTER — OFFICE VISIT (OUTPATIENT)
Dept: PHYSICAL THERAPY | Facility: CLINIC | Age: 59
End: 2024-03-05
Payer: COMMERCIAL

## 2024-03-05 DIAGNOSIS — R42 VERTIGO: Primary | ICD-10-CM

## 2024-03-05 PROCEDURE — 97112 NEUROMUSCULAR REEDUCATION: CPT | Performed by: PHYSICAL THERAPIST

## 2024-03-05 NOTE — PROGRESS NOTES
"Daily Note     Today's date: 3/5/2024  Patient name: Vicki Smith  : 1965  MRN: [id number]  Referring provider: Sunita Mitchell MD  Dx:   Encounter Diagnosis     ICD-10-CM    1. Vertigo  R42                      Subjective: Vicki notes continued trend of improvement as she is walking muhc better at work and home. She does continue to have ~25% impairment. She made appt with neurology as she and  feel they want specialist consult.       Objective: See treatment diary below      Assessment: Vicki Smith was progressed with PT interventions, focusing on appropriate technique and intensity. Pt. Required moderate cuing from therapist to complete. Motor planning remains difficult for patient and requires ongoing cuing. SLS tasks difficlt today but were trained for 30 minutes with reduced cuing.  Pt. Would benefit from continued physical therapy to promote improved activity tolerance and function.         Plan: Continue per plan of care.  Progress treatment as tolerated.       Precautions: None     Daily Treatment Diary:      Initial Evaluation Date: 23  Compliance 2/14 2/28 3/5   12/27  1/3  1/10  1/24  2/5  2/7   Visit Number 14 15 16   5  6  7  8  9  10   Re-Eval  -  -   -  -  NV  NV  Y  -   Foto Captured -  -   -  NV  NV  NV  Y  -          2/14 2/28 3/5   12/27  1/3  1/10  1/24  2/5  2/7   Manual                   epley 2x ea  -                                                      Ther-Ex                   Narrow Sixto EC On ae c dual task On AE  5' On AE 5'   on AE 3x45'  on AE 3x45\"  on AE 3x45'  on AE 3x45'  on AE 3x45  on ae c dual task 3x45\"   Tandem Stance EC Walking and static 5' Walking and static 5' Walking and static 5'   3x45'  3x45'  3x45' in // bars  3x45'  3x45\"  walking c ts 5L eo and ec   AE leans 20x ea c guarding 20x ea   20x + head turns 20x ea   15x min A  20x eyes closed mod a  20x ec  20x ea  20x ea  20x   SLS c task Cone taps 20x Cone taps with crossing midline  20x ea " "Cone taps with midline cross   test  ball movement 3'  nv  2x1'  2x1'     Narrow dheeraj c head turn toss -       2x1' in bars  dc         Cone taps above  above       cone walks 5L  cone walks 5L c steady tap  cone walks 5L  dual task**  10x ball balance   Wobble board A/P --     2x45\"  3'   3'  3'  3'  nv   Wobble Board Lateral      2x45\"  3'  3'  3'  3'  nv   Jean-Baptiste daroff Hep added  Standing leans 20x                                   Neuro Re-Ed                   Wide dheeraj c turn and tposs          nv         Gait c tasks 15' steps, pbstacles  15' obstacles c dual task/  Balance board + ball 15' obstacles and stairs   15' 4 tasks, up down, l/r, tandem, fast/slow 5' 5' floor, 5' steps 5' floor, 5' steps 10' steps fear avoidance traiing Obstacle course and starirs 15' c dual task                                          Ther-Act                                                            Modalities                                                                                 "

## 2024-03-13 ENCOUNTER — OFFICE VISIT (OUTPATIENT)
Dept: PHYSICAL THERAPY | Facility: CLINIC | Age: 59
End: 2024-03-13
Payer: COMMERCIAL

## 2024-03-13 DIAGNOSIS — R42 VERTIGO: Primary | ICD-10-CM

## 2024-03-13 PROCEDURE — 97112 NEUROMUSCULAR REEDUCATION: CPT | Performed by: PHYSICAL THERAPIST

## 2024-03-13 NOTE — PROGRESS NOTES
"Daily Note     Today's date: 3/13/2024  Patient name: Vicki Smith  : 1965  MRN: [id number]  Referring provider: Sunita Mitchell MD  Dx:   Encounter Diagnosis     ICD-10-CM    1. Vertigo  R42                      Subjective: Vicki notes continued trend. Feels she is doing well and reports  went well at work. She is, however, continuing to be frustrated with being \"off.\" This is in regards to working memory and recall. She does plan to see new neurologist. She admittedly feels her stress contributes to her focus issues.       Objective: See treatment diary below      Assessment: Tcontinued to train and focus on dual task performance. Performed cranial nerve screen which was unremarkable. Balance impairments remain and are very intermittent and dependent on dual tasking. Updated HEP.       Plan: Continue per plan of care.  Progress treatment as tolerated.       Precautions: None     Daily Treatment Diary:      Initial Evaluation Date: 23  Compliance 2/14 2/28 3/5 3/13  12/27  1/3  1/10  1/24  2/5  2/7   Visit Number 14 15 16 17  5  6  7  8  9  10   Re-Eval  -  - -  -  -  NV  NV  Y  -   Foto Captured -  - -  -  NV  NV  NV  Y  -          2/14 2/28 3/5 3/13  12/27  1/3  1/10  1/24  2/5  2/7   Manual                   epley 2x ea  -                                                      Ther-Ex                   Narrow Sixto EC On ae c dual task On AE  5' On AE 5' On ae 30x  on AE 3x45'  on AE 3x45\"  on AE 3x45'  on AE 3x45'  on AE 3x45  on ae c dual task 3x45\"   Tandem Stance EC Walking and static 5' Walking and static 5' Walking and static 5' Walking and static 10'  3x45'  3x45'  3x45' in // bars  3x45'  3x45\"  walking c ts 5L eo and ec   AE leans 20x ea c guarding 20x ea   20x + head turns 20x ea 20x ea  15x min A  20x eyes closed mod a  20x ec  20x ea  20x ea  20x   SLS c task Cone taps 20x Cone taps with crossing midline  20x ea Cone taps with midline cross Cone taps with midline cross  test  ball " "movement 3'  nv  2x1'  2x1'     Narrow dheeraj c head turn toss -       2x1' in bars  dc         Cone taps above  above       cone walks 5L  cone walks 5L c steady tap  cone walks 5L  dual task**  10x ball balance   Wobble board A/P --     2x45\"  3'   3'  3'  3'  nv   Wobble Board Lateral      2x45\"  3'  3'  3'  3'  nv   Jean-Baptiste daroff Hep added  Standing leans 20x Leans 20x                                  Neuro Re-Ed                   Wide dheeraj c turn and tposs          nv         Gait c tasks 15' steps, pbstacles  15' obstacles c dual task/  Balance board + ball 15' obstacles and stairs  15' gait outside and stairs c dual task 15' 4 tasks, up down, l/r, tandem, fast/slow 5' 5' floor, 5' steps 5' floor, 5' steps 10' steps fear avoidance traiing Obstacle course and starirs 15' c dual task                                          Ther-Act                                                            Modalities                                                                                   "

## 2024-03-20 ENCOUNTER — OFFICE VISIT (OUTPATIENT)
Dept: PHYSICAL THERAPY | Facility: CLINIC | Age: 59
End: 2024-03-20
Payer: COMMERCIAL

## 2024-03-20 DIAGNOSIS — R42 VERTIGO: Primary | ICD-10-CM

## 2024-03-20 PROCEDURE — 97112 NEUROMUSCULAR REEDUCATION: CPT | Performed by: PHYSICAL THERAPIST

## 2024-03-20 PROCEDURE — 97110 THERAPEUTIC EXERCISES: CPT | Performed by: PHYSICAL THERAPIST

## 2024-03-20 NOTE — PROGRESS NOTES
"Daily Note     Today's date: 3/20/2024  Patient name: Vicki Smith  : 1965  MRN: [id number]  Referring provider: Sunita Mitchell MD  Dx:   Encounter Diagnosis     ICD-10-CM    1. Vertigo  R42                      Subjective: Vicki having a bad weekend in regards to stress and ativity. Her mother experienced 2 seizures and tripped on curb walking into convenience store in a hurry. She continues to feel she is making progress. She admits to dual tasking and mental effort allows for poor balance with tasks.       Objective: See treatment diary below      Assessment: dual task transition training appear improved today. Index finger B ball balance with stairs and narrow dheeraj tasks went quite well in comparison to past. She has not been able to participate in hep since last week due to stressful home life. Due to ongonig progress, discussed poc and reocmmendc ontinued pt. Also recommend mindfulness of task with vicki to practice when performing tasks at higher risk of LOB.       Plan: Continue per plan of care.  Progress treatment as tolerated.       Precautions: None     Daily Treatment Diary:      Initial Evaluation Date: 23  Compliance 2/14 2/28 3/5 3/13 3/20    1/24  2/5  2/7   Visit Number 14 15 16 17 18    8  9  10   Re-Eval  -  - - -    NV  Y  -   Foto Captured -  - - -    NV  Y  -          2/14 2/28 3/5 3/13 3/20    1/24  2/5  2/7   Manual                epley 2x ea  -                                             Ther-Ex                Narrow Dheeraj EC On ae c dual task On AE  5' On AE 5' On ae 30x On ae 30x    on AE 3x45'  on AE 3x45  on ae c dual task 3x45\"   Tandem Stance EC Walking and static 5' Walking and static 5' Walking and static 5' Walking and static 10' Walking and static 10'    3x45'  3x45\"  walking c ts 5L eo and ec   AE leans 20x ea c guarding 20x ea   20x + head turns 20x ea 20x ea 20x ea    20x ea  20x ea  20x   SLS c task Cone taps 20x Cone taps with crossing midline  20x ea Cone " taps with midline cross Cone taps with midline cross Cone taps with midline cross    2x1'  2x1'     Narrow dheeraj c head turn toss -               Cone taps above  above  above    cone walks 5L c steady tap  cone walks 5L  dual task**  10x ball balance   Wobble board A/P --    20x c ball     3'  3'  nv   Wobble Board Lateral     20x    3'  3'  nv   Jean-Baptiste daroff Hep added  Standing leans 20x Leans 20x Leans 20                           Neuro Re-Ed                Wide dheeraj c turn and tposs                Gait c tasks 15' steps, pbstacles  15' obstacles c dual task/  Balance board + ball 15' obstacles and stairs  15' gait outside and stairs c dual task 15' gait outside stairs and obstacles   5' floor, 5' steps 10' steps fear avoidance traiing Obstacle course and starirs 15' c dual task                                          Ther-Act                                                         Modalities

## 2024-03-27 ENCOUNTER — APPOINTMENT (OUTPATIENT)
Dept: PHYSICAL THERAPY | Facility: CLINIC | Age: 59
End: 2024-03-27
Payer: COMMERCIAL